# Patient Record
Sex: FEMALE | Race: BLACK OR AFRICAN AMERICAN | Employment: OTHER | URBAN - METROPOLITAN AREA
[De-identification: names, ages, dates, MRNs, and addresses within clinical notes are randomized per-mention and may not be internally consistent; named-entity substitution may affect disease eponyms.]

---

## 2023-04-10 NOTE — LETTER
No information on file.        Consent for Coronary CT Angiography    Date of Procedure:     * No procedures found * on [facility]    Your doctor has recommended that you have a Coronary CT Angiography procedure. Coronary CT Angiography is a diagnostic procedure using computed tomography to scan the chest and coronary arteries. It is a non-invasive technique that allows clear visualization of narrowed and blocked arteries. Blockage in these arteries may lead to heart attack or stroke.    You will lie on a table that slides slowly into a large circular opening called the CT gantry. The procedure will be performed by the CT Staff, including a nurse, a technologist, and a patient assistant. The staff will be with you throughout the entire procedure to explain each step, make you as comfortable as possible, and answer all of your questions. The staff will take a series of images of your heart and chest to help define the area to be imaged. You will be asked to hold your breath for a short time as each series of images is performed and acquired.     You may receive medication called a beta blocker that will slow your hear rate down. This is needed so we can obtain better images of your heart. You may also receive a nitroglycerine spray under your tongue. This medication is given to dilate the arteries for better picture quality. The nitroglycerine may cause a drop in blood pressure. During the procedure you will receive an injection of a contrast material, which assists the physician in highlighting the anatomy of your heart. You may experience a warm sensation through your body and a metallic taste in your mouth. In rare circumstances, a rash and/or hives may occur. It's very important to inform your care giver of any changes you may feel or experience.     The medication and the contrast material used as part of your procedure are all deemed very safe, however there is always an element of risk to a patient when  taking medication. Allergic reactions to medication can range from very minor to very serious, leading to a life threatening situation or even death. Please be sure to communicate any allergy you may have to your caregiver immediately.    The information that is obtained during your testing will be treated as privileged and confidential. The information obtained will be given to your doctor and may be used for insurance purposes or to track and trend data as part of  with your privacy retained.     I, Teresa Saavedra, have read and understand the above information. I voluntarily agree and consent to have the Coronary CT Angiography (CTA) Exam. Furthermore, no guarantees or assurances have been given by anyone as to the results that may be obtained from this procedure. Any questions that may have occurred to me have been answered to my satisfaction.    Signature of Patient: __________________________Date: ___________Time: _______      Patient Name: Teresa Saavedra    : 1975      Printed: 2024      Medical Record #: [id number]   [History reviewed] : History reviewed. [Medications and Allergies reviewed] : Medications and allergies reviewed.

## 2024-01-18 ENCOUNTER — HOSPITAL ENCOUNTER (OUTPATIENT)
Facility: HOSPITAL | Age: 49
Setting detail: OBSERVATION
Discharge: HOME | End: 2024-01-19
Attending: EMERGENCY MEDICINE | Admitting: INTERNAL MEDICINE
Payer: MEDICARE

## 2024-01-18 ENCOUNTER — APPOINTMENT (OUTPATIENT)
Dept: GENERAL RADIOLOGY | Facility: HOSPITAL | Age: 49
End: 2024-01-18
Payer: MEDICARE

## 2024-01-18 DIAGNOSIS — R07.9 ACUTE CHEST PAIN: Primary | ICD-10-CM

## 2024-01-18 DIAGNOSIS — Z86.79 HISTORY OF CARDIOMYOPATHY: ICD-10-CM

## 2024-01-18 LAB
ALBUMIN SERPL-MCNC: 4.4 G/DL (ref 3.2–4.8)
ALBUMIN/GLOB SERPL: 1.1 {RATIO} (ref 1–2)
ALP LIVER SERPL-CCNC: 86 U/L
ALT SERPL-CCNC: 11 U/L
ANION GAP SERPL CALC-SCNC: 7 MMOL/L (ref 0–18)
AST SERPL-CCNC: 24 U/L (ref ?–34)
BILIRUB SERPL-MCNC: 0.2 MG/DL (ref 0.3–1.2)
BUN BLD-MCNC: 13 MG/DL (ref 9–23)
BUN/CREAT SERPL: 15.9 (ref 10–20)
CALCIUM BLD-MCNC: 9.4 MG/DL (ref 8.7–10.4)
CHLORIDE SERPL-SCNC: 110 MMOL/L (ref 98–112)
CK SERPL-CCNC: 148 U/L
CO2 SERPL-SCNC: 23 MMOL/L (ref 21–32)
CREAT BLD-MCNC: 0.82 MG/DL
D DIMER PPP FEU-MCNC: 0.43 UG/ML FEU (ref ?–0.5)
EGFRCR SERPLBLD CKD-EPI 2021: 89 ML/MIN/1.73M2 (ref 60–?)
GLOBULIN PLAS-MCNC: 3.9 G/DL (ref 2.8–4.4)
GLUCOSE BLD-MCNC: 90 MG/DL (ref 70–99)
OSMOLALITY SERPL CALC.SUM OF ELEC: 290 MOSM/KG (ref 275–295)
POTASSIUM SERPL-SCNC: 4.3 MMOL/L (ref 3.5–5.1)
PROT SERPL-MCNC: 8.3 G/DL (ref 5.7–8.2)
SODIUM SERPL-SCNC: 140 MMOL/L (ref 136–145)
TROPONIN I SERPL HS-MCNC: <3 NG/L
TROPONIN I SERPL HS-MCNC: <3 NG/L

## 2024-01-18 PROCEDURE — 71045 X-RAY EXAM CHEST 1 VIEW: CPT | Performed by: EMERGENCY MEDICINE

## 2024-01-18 PROCEDURE — 84484 ASSAY OF TROPONIN QUANT: CPT | Performed by: EMERGENCY MEDICINE

## 2024-01-18 PROCEDURE — 96376 TX/PRO/DX INJ SAME DRUG ADON: CPT

## 2024-01-18 PROCEDURE — 93010 ELECTROCARDIOGRAM REPORT: CPT

## 2024-01-18 PROCEDURE — 93005 ELECTROCARDIOGRAM TRACING: CPT

## 2024-01-18 PROCEDURE — 85025 COMPLETE CBC W/AUTO DIFF WBC: CPT | Performed by: EMERGENCY MEDICINE

## 2024-01-18 PROCEDURE — 99285 EMERGENCY DEPT VISIT HI MDM: CPT

## 2024-01-18 PROCEDURE — 96375 TX/PRO/DX INJ NEW DRUG ADDON: CPT

## 2024-01-18 PROCEDURE — 82550 ASSAY OF CK (CPK): CPT | Performed by: EMERGENCY MEDICINE

## 2024-01-18 PROCEDURE — 96374 THER/PROPH/DIAG INJ IV PUSH: CPT

## 2024-01-18 PROCEDURE — 85379 FIBRIN DEGRADATION QUANT: CPT | Performed by: EMERGENCY MEDICINE

## 2024-01-18 PROCEDURE — 80053 COMPREHEN METABOLIC PANEL: CPT | Performed by: EMERGENCY MEDICINE

## 2024-01-18 PROCEDURE — 85060 BLOOD SMEAR INTERPRETATION: CPT | Performed by: EMERGENCY MEDICINE

## 2024-01-18 RX ORDER — DROPERIDOL 2.5 MG/ML
1.25 INJECTION, SOLUTION INTRAMUSCULAR; INTRAVENOUS ONCE
Status: COMPLETED | OUTPATIENT
Start: 2024-01-18 | End: 2024-01-18

## 2024-01-18 RX ORDER — KETOROLAC TROMETHAMINE 15 MG/ML
15 INJECTION, SOLUTION INTRAMUSCULAR; INTRAVENOUS ONCE
Status: COMPLETED | OUTPATIENT
Start: 2024-01-18 | End: 2024-01-18

## 2024-01-18 RX ORDER — DIAZEPAM 5 MG/ML
5 INJECTION, SOLUTION INTRAMUSCULAR; INTRAVENOUS ONCE
Status: COMPLETED | OUTPATIENT
Start: 2024-01-18 | End: 2024-01-18

## 2024-01-18 RX ORDER — MULTIVIT-MIN/IRON/FOLIC ACID/K 18-600-40
50000 CAPSULE ORAL WEEKLY
COMMUNITY

## 2024-01-18 RX ORDER — OXYCODONE HYDROCHLORIDE 20 MG/1
20 TABLET ORAL 2 TIMES DAILY PRN
COMMUNITY

## 2024-01-18 RX ORDER — ASPIRIN 81 MG/1
81 TABLET ORAL DAILY
COMMUNITY

## 2024-01-18 RX ORDER — MORPHINE SULFATE 4 MG/ML
4 INJECTION, SOLUTION INTRAMUSCULAR; INTRAVENOUS ONCE
Status: COMPLETED | OUTPATIENT
Start: 2024-01-18 | End: 2024-01-18

## 2024-01-19 ENCOUNTER — APPOINTMENT (OUTPATIENT)
Dept: CT IMAGING | Facility: HOSPITAL | Age: 49
End: 2024-01-19
Attending: INTERNAL MEDICINE
Payer: MEDICARE

## 2024-01-19 ENCOUNTER — APPOINTMENT (OUTPATIENT)
Dept: CV DIAGNOSTICS | Facility: HOSPITAL | Age: 49
End: 2024-01-19
Attending: EMERGENCY MEDICINE
Payer: MEDICARE

## 2024-01-19 VITALS
BODY MASS INDEX: 38.29 KG/M2 | RESPIRATION RATE: 16 BRPM | OXYGEN SATURATION: 100 % | HEIGHT: 70.5 IN | TEMPERATURE: 97 F | DIASTOLIC BLOOD PRESSURE: 77 MMHG | SYSTOLIC BLOOD PRESSURE: 140 MMHG | HEART RATE: 80 BPM | WEIGHT: 270.5 LBS

## 2024-01-19 PROBLEM — Z86.79 HISTORY OF CARDIOMYOPATHY: Status: ACTIVE | Noted: 2024-01-19

## 2024-01-19 LAB
ANION GAP SERPL CALC-SCNC: 2 MMOL/L (ref 0–18)
ATRIAL RATE: 66 BPM
ATRIAL RATE: 69 BPM
BASOPHILS # BLD AUTO: 0.03 X10(3) UL (ref 0–0.2)
BASOPHILS # BLD AUTO: 0.05 X10(3) UL (ref 0–0.2)
BASOPHILS NFR BLD AUTO: 0.5 %
BASOPHILS NFR BLD AUTO: 0.5 %
BUN BLD-MCNC: 14 MG/DL (ref 9–23)
BUN/CREAT SERPL: 20 (ref 10–20)
CALCIUM BLD-MCNC: 9.1 MG/DL (ref 8.7–10.4)
CHLORIDE SERPL-SCNC: 111 MMOL/L (ref 98–112)
CO2 SERPL-SCNC: 28 MMOL/L (ref 21–32)
CREAT BLD-MCNC: 0.7 MG/DL
DEPRECATED RDW RBC AUTO: 48.6 FL (ref 35.1–46.3)
DEPRECATED RDW RBC AUTO: 48.9 FL (ref 35.1–46.3)
EGFRCR SERPLBLD CKD-EPI 2021: 108 ML/MIN/1.73M2 (ref 60–?)
EOSINOPHIL # BLD AUTO: 1.31 X10(3) UL (ref 0–0.7)
EOSINOPHIL # BLD AUTO: 1.62 X10(3) UL (ref 0–0.7)
EOSINOPHIL NFR BLD AUTO: 17.5 %
EOSINOPHIL NFR BLD AUTO: 21 %
ERYTHROCYTE [DISTWIDTH] IN BLOOD BY AUTOMATED COUNT: 14.1 % (ref 11–15)
ERYTHROCYTE [DISTWIDTH] IN BLOOD BY AUTOMATED COUNT: 14.3 % (ref 11–15)
GLUCOSE BLD-MCNC: 88 MG/DL (ref 70–99)
HCT VFR BLD AUTO: 31.8 %
HCT VFR BLD AUTO: 35.6 %
HGB BLD-MCNC: 10.2 G/DL
HGB BLD-MCNC: 11.6 G/DL
IMM GRANULOCYTES # BLD AUTO: 0.01 X10(3) UL (ref 0–1)
IMM GRANULOCYTES # BLD AUTO: 0.02 X10(3) UL (ref 0–1)
IMM GRANULOCYTES NFR BLD: 0.2 %
IMM GRANULOCYTES NFR BLD: 0.2 %
LYMPHOCYTES # BLD AUTO: 1.67 X10(3) UL (ref 1–4)
LYMPHOCYTES # BLD AUTO: 2.15 X10(3) UL (ref 1–4)
LYMPHOCYTES NFR BLD AUTO: 23.2 %
LYMPHOCYTES NFR BLD AUTO: 26.7 %
MCH RBC QN AUTO: 30 PG (ref 26–34)
MCH RBC QN AUTO: 30.2 PG (ref 26–34)
MCHC RBC AUTO-ENTMCNC: 32.1 G/DL (ref 31–37)
MCHC RBC AUTO-ENTMCNC: 32.6 G/DL (ref 31–37)
MCV RBC AUTO: 92 FL
MCV RBC AUTO: 94.1 FL
MONOCYTES # BLD AUTO: 0.3 X10(3) UL (ref 0.1–1)
MONOCYTES # BLD AUTO: 0.39 X10(3) UL (ref 0.1–1)
MONOCYTES NFR BLD AUTO: 4.2 %
MONOCYTES NFR BLD AUTO: 4.8 %
NEUTROPHILS # BLD AUTO: 2.93 X10 (3) UL (ref 1.5–7.7)
NEUTROPHILS # BLD AUTO: 2.93 X10(3) UL (ref 1.5–7.7)
NEUTROPHILS # BLD AUTO: 5.03 X10 (3) UL (ref 1.5–7.7)
NEUTROPHILS # BLD AUTO: 5.03 X10(3) UL (ref 1.5–7.7)
NEUTROPHILS NFR BLD AUTO: 46.8 %
NEUTROPHILS NFR BLD AUTO: 54.4 %
OSMOLALITY SERPL CALC.SUM OF ELEC: 292 MOSM/KG (ref 275–295)
P AXIS: 51 DEGREES
P AXIS: 65 DEGREES
P-R INTERVAL: 218 MS
P-R INTERVAL: 246 MS
PLATELET # BLD AUTO: 221 10(3)UL (ref 150–450)
PLATELET # BLD AUTO: 246 10(3)UL (ref 150–450)
POTASSIUM SERPL-SCNC: 3.6 MMOL/L (ref 3.5–5.1)
Q-T INTERVAL: 388 MS
Q-T INTERVAL: 390 MS
QRS DURATION: 74 MS
QRS DURATION: 78 MS
QTC CALCULATION (BEZET): 408 MS
QTC CALCULATION (BEZET): 415 MS
R AXIS: 17 DEGREES
R AXIS: 20 DEGREES
RBC # BLD AUTO: 3.38 X10(6)UL
RBC # BLD AUTO: 3.87 X10(6)UL
SODIUM SERPL-SCNC: 141 MMOL/L (ref 136–145)
T AXIS: 16 DEGREES
T AXIS: 21 DEGREES
TROPONIN I SERPL HS-MCNC: 3 NG/L
VENTRICULAR RATE: 66 BPM
VENTRICULAR RATE: 69 BPM
WBC # BLD AUTO: 6.3 X10(3) UL (ref 4–11)
WBC # BLD AUTO: 9.3 X10(3) UL (ref 4–11)

## 2024-01-19 PROCEDURE — 85025 COMPLETE CBC W/AUTO DIFF WBC: CPT | Performed by: INTERNAL MEDICINE

## 2024-01-19 PROCEDURE — 80307 DRUG TEST PRSMV CHEM ANLYZR: CPT | Performed by: STUDENT IN AN ORGANIZED HEALTH CARE EDUCATION/TRAINING PROGRAM

## 2024-01-19 PROCEDURE — 93306 TTE W/DOPPLER COMPLETE: CPT | Performed by: EMERGENCY MEDICINE

## 2024-01-19 PROCEDURE — 75574 CT ANGIO HRT W/3D IMAGE: CPT | Performed by: INTERNAL MEDICINE

## 2024-01-19 PROCEDURE — 84484 ASSAY OF TROPONIN QUANT: CPT | Performed by: INTERNAL MEDICINE

## 2024-01-19 PROCEDURE — 75580 N-INVAS EST C FFR SW ALY CTA: CPT | Performed by: INTERNAL MEDICINE

## 2024-01-19 PROCEDURE — 80048 BASIC METABOLIC PNL TOTAL CA: CPT | Performed by: INTERNAL MEDICINE

## 2024-01-19 RX ORDER — NITROGLYCERIN 0.4 MG/1
0.4 TABLET SUBLINGUAL ONCE
Status: COMPLETED | OUTPATIENT
Start: 2024-01-19 | End: 2024-01-19

## 2024-01-19 RX ORDER — METOPROLOL TARTRATE 50 MG/1
100 TABLET, FILM COATED ORAL ONCE AS NEEDED
Status: DISCONTINUED | OUTPATIENT
Start: 2024-01-20 | End: 2024-01-19

## 2024-01-19 RX ORDER — POTASSIUM CHLORIDE 20 MEQ/1
40 TABLET, EXTENDED RELEASE ORAL EVERY 4 HOURS
Status: COMPLETED | OUTPATIENT
Start: 2024-01-19 | End: 2024-01-19

## 2024-01-19 RX ORDER — HEPARIN SODIUM 5000 [USP'U]/ML
5000 INJECTION, SOLUTION INTRAVENOUS; SUBCUTANEOUS EVERY 8 HOURS SCHEDULED
Status: DISCONTINUED | OUTPATIENT
Start: 2024-01-19 | End: 2024-01-19

## 2024-01-19 RX ORDER — METOPROLOL TARTRATE 1 MG/ML
5 INJECTION, SOLUTION INTRAVENOUS SEE ADMIN INSTRUCTIONS
Status: DISCONTINUED | OUTPATIENT
Start: 2024-01-19 | End: 2024-01-19

## 2024-01-19 RX ORDER — SENNOSIDES 8.6 MG
17.2 TABLET ORAL NIGHTLY PRN
Status: DISCONTINUED | OUTPATIENT
Start: 2024-01-19 | End: 2024-01-19

## 2024-01-19 RX ORDER — METOPROLOL TARTRATE 50 MG/1
50 TABLET, FILM COATED ORAL ONCE
Status: DISCONTINUED | OUTPATIENT
Start: 2024-01-20 | End: 2024-01-19

## 2024-01-19 RX ORDER — METOPROLOL TARTRATE 50 MG/1
50 TABLET, FILM COATED ORAL ONCE AS NEEDED
Status: COMPLETED | OUTPATIENT
Start: 2024-01-19 | End: 2024-01-19

## 2024-01-19 RX ORDER — BISACODYL 10 MG
10 SUPPOSITORY, RECTAL RECTAL
Status: DISCONTINUED | OUTPATIENT
Start: 2024-01-19 | End: 2024-01-19

## 2024-01-19 RX ORDER — METOPROLOL TARTRATE 50 MG/1
100 TABLET, FILM COATED ORAL ONCE AS NEEDED
Status: COMPLETED | OUTPATIENT
Start: 2024-01-19 | End: 2024-01-19

## 2024-01-19 RX ORDER — ALBUTEROL SULFATE 90 UG/1
2 AEROSOL, METERED RESPIRATORY (INHALATION) EVERY 4 HOURS PRN
COMMUNITY

## 2024-01-19 RX ORDER — OXYCODONE HYDROCHLORIDE 5 MG/1
20 TABLET ORAL 2 TIMES DAILY PRN
Status: DISCONTINUED | OUTPATIENT
Start: 2024-01-19 | End: 2024-01-19

## 2024-01-19 RX ORDER — POLYETHYLENE GLYCOL 3350 17 G/17G
17 POWDER, FOR SOLUTION ORAL DAILY PRN
Status: DISCONTINUED | OUTPATIENT
Start: 2024-01-19 | End: 2024-01-19

## 2024-01-19 RX ORDER — PROCHLORPERAZINE EDISYLATE 5 MG/ML
5 INJECTION INTRAMUSCULAR; INTRAVENOUS EVERY 8 HOURS PRN
Status: DISCONTINUED | OUTPATIENT
Start: 2024-01-19 | End: 2024-01-19

## 2024-01-19 RX ORDER — LIDOCAINE 50 MG/G
1 PATCH TOPICAL EVERY 24 HOURS
Qty: 30 PATCH | Refills: 0 | Status: SHIPPED | OUTPATIENT
Start: 2024-01-19

## 2024-01-19 RX ORDER — METOPROLOL TARTRATE 1 MG/ML
INJECTION, SOLUTION INTRAVENOUS
Status: DISCONTINUED
Start: 2024-01-19 | End: 2024-01-19 | Stop reason: WASHOUT

## 2024-01-19 RX ORDER — ACETAMINOPHEN 325 MG/1
650 TABLET ORAL EVERY 6 HOURS PRN
Status: DISCONTINUED | OUTPATIENT
Start: 2024-01-19 | End: 2024-01-19

## 2024-01-19 RX ORDER — METOPROLOL TARTRATE 50 MG/1
50 TABLET, FILM COATED ORAL ONCE
Status: DISCONTINUED | OUTPATIENT
Start: 2024-01-19 | End: 2024-01-19

## 2024-01-19 RX ORDER — ONDANSETRON 2 MG/ML
4 INJECTION INTRAMUSCULAR; INTRAVENOUS EVERY 6 HOURS PRN
Status: DISCONTINUED | OUTPATIENT
Start: 2024-01-19 | End: 2024-01-19

## 2024-01-19 RX ORDER — DILTIAZEM HYDROCHLORIDE 5 MG/ML
5 INJECTION INTRAVENOUS SEE ADMIN INSTRUCTIONS
Status: DISCONTINUED | OUTPATIENT
Start: 2024-01-19 | End: 2024-01-19

## 2024-01-19 RX ORDER — METOPROLOL TARTRATE 50 MG/1
100 TABLET, FILM COATED ORAL ONCE
Status: DISCONTINUED | OUTPATIENT
Start: 2024-01-20 | End: 2024-01-19

## 2024-01-19 RX ORDER — ENEMA 19; 7 G/133ML; G/133ML
1 ENEMA RECTAL ONCE AS NEEDED
Status: DISCONTINUED | OUTPATIENT
Start: 2024-01-19 | End: 2024-01-19

## 2024-01-19 RX ORDER — NITROGLYCERIN 0.4 MG/1
0.4 TABLET SUBLINGUAL EVERY 5 MIN PRN
Status: DISCONTINUED | OUTPATIENT
Start: 2024-01-19 | End: 2024-01-19

## 2024-01-19 RX ORDER — METOPROLOL TARTRATE 50 MG/1
50 TABLET, FILM COATED ORAL ONCE AS NEEDED
Status: DISCONTINUED | OUTPATIENT
Start: 2024-01-20 | End: 2024-01-19

## 2024-01-19 RX ORDER — ASPIRIN 81 MG/1
81 TABLET ORAL DAILY
Status: DISCONTINUED | OUTPATIENT
Start: 2024-01-19 | End: 2024-01-19

## 2024-01-19 RX ORDER — METOPROLOL TARTRATE 50 MG/1
100 TABLET, FILM COATED ORAL ONCE
Status: DISCONTINUED | OUTPATIENT
Start: 2024-01-19 | End: 2024-01-19

## 2024-01-19 RX ORDER — SODIUM CHLORIDE, SODIUM LACTATE, POTASSIUM CHLORIDE, CALCIUM CHLORIDE 600; 310; 30; 20 MG/100ML; MG/100ML; MG/100ML; MG/100ML
INJECTION, SOLUTION INTRAVENOUS CONTINUOUS
Status: DISCONTINUED | OUTPATIENT
Start: 2024-01-19 | End: 2024-01-19

## 2024-01-19 NOTE — DISCHARGE SUMMARY
General Medicine Discharge Summary     Patient ID:  Teresa Saavedra  48 year old  2/1/1975    Admit date: 1/18/2024    Discharge date and time: 01/19/24    Attending Physician: Aliyah Mayer DO     Consults: IP CONSULT TO CARDIOLOGY    Primary Care Physician: No primary care provider on file.     Reason for admission: non cardiac CP    Risk of Readmission Lace+ Score: 21  59-90 High Risk  29-58 Medium Risk  0-28   Low Risk    Discharge Diagnoses: Acute chest pain [R07.9]  History of cardiomyopathy [Z86.79]  See Additional Discharge Diagnoses in Hospital Course    Discharged Condition: good    Follow-up with labs/images appointments:   F/u with PCP    Exam  Gen: No acute distress  Pulm: Lungs clear, normal respiratory effort  CV: Heart with regular rate and rhythm  Abd: Abdomen soft,   EXT: no edema     HPI:  Patient is a 48 year old female with PMH sig for asthma, DVT not on AC who presented with chest pain. Patient said that pain developed acutely yesterday when she was sitting. Comes and goes. No radiation, no shortness of breath. Located left side of her chest, sharp in nature. 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 Upon arrival, vitals stable. High sensitivity troponin of 3 and 3. EKG showed no acute ST changes. She was subsequently admitted for further medical management.      Hospital Course:   Patient is a 48 year old female with PMH sig for asthma, DVT not on AC who presented with chest pain. Underwent cardiac evaluation. Troponin and EKG negative. Seen by cardiology and had CTA and echo completed. Echo normal with no WMA, dc with CTA pending read     Atypical chest pain  - likely noncardiac, reproducible on examination  - high sensitivity troponins of 3 and 3  - EKG reviewed, no acute ST changes  - evaluated by cardiology, appreciate recommendations   - echo completed, EF 55%, no WMA  - CTA coronary pending, if normal then  cleared for dc  - prn lidocaine patch and tylenol for CP  - continue ASA     Asthma  - chronic, stable  - prn albuterol     Chronic pain  - resume home pain medications     Daily cigarette smoker  - > 20 years  - counseled on smoking cessation for 5 minutes    Operative Procedures:      Imaging: CT CARDIAC OVER READ    Result Date: 1/19/2024  CONCLUSION: As above    Dictated by (CST): Og Padron MD on 1/19/2024 at 12:06 PM     Finalized by (CST): Og Padron MD on 1/19/2024 at 12:07 PM          XR CHEST AP PORTABLE  (CPT=71045)    Result Date: 1/18/2024  CONCLUSION: Normal examination.     Dictated by (CST): Bello Cordova MD on 1/18/2024 at 8:12 PM     Finalized by (CST): Bello Cordova MD on 1/18/2024 at 8:13 PM             Disposition: home    Activity: activity as tolerated  Diet: regular diet  Wound Care: as directed  Code Status: No Order  O2:     Home Medication Changes:     Med list     Medication List        START taking these medications      lidocaine 5 % Ptch  Commonly known as: Lidoderm  Place 1 patch onto the skin daily.            CONTINUE taking these medications      albuterol 108 (90 Base) MCG/ACT Aers  Commonly known as: Ventolin HFA     aspirin 81 MG Tbec     oxyCODONE HCl 20 MG Tabs     Vitamin D 50 MCG (2000 UT) Caps               Where to Get Your Medications        You can get these medications from any pharmacy    Bring a paper prescription for each of these medications  lidocaine 5 % Ptch         FU      DC instructions:      I reconciled current and discharge medications on day of discharge, discussed changes with patient and noted changes above.       Total Time Coordinating Care: Greater than 30 minutes    Patient had opportunity to ask questions and state understand and agree with therapeutic plan as outlined    Thank You,    Aliyah Mayer DO   Hospitalist with [facility]

## 2024-01-19 NOTE — H&P
[facility] Hospitalist H&P       CC:   Chief Complaint   Patient presents with    Chest Pain Angina        PCP: No primary care provider on file.    History of Present Illness: Patient is a 48 year old female with PMH sig for asthma, DVT not on AC who presented with chest pain. Patient said that pain developed acutely yesterday when she was sitting. Comes and goes. No radiation, no shortness of breath. Located left side of her chest, sharp in nature. 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 Upon arrival, vitals stable. High sensitivity troponin of 3 and 3. EKG showed no acute ST changes. She was subsequently admitted for further medical management.       PMH  Past Medical History:   Diagnosis Date    Asthma     DVT of leg (deep venous thrombosis) (HCC)     left thigh 2023    Heart attack (HCC)         PSH  Past Surgical History:   Procedure Laterality Date    UPPER ARM/ELBOW SURGERY UNLISTED Right         ALL:  Allergies   Allergen Reactions    Percocet [Oxycodone-Acetaminophen] HIVES        Home Medications:  Outpatient Medications Marked as Taking for the 1/18/24 encounter (Hospital Encounter)   Medication Sig Dispense Refill    albuterol 108 (90 Base) MCG/ACT Inhalation Aero Soln Inhale 2 puffs into the lungs every 4 (four) hours as needed for Wheezing.      oxyCODONE HCl 20 MG Oral Tab Take 1 tablet (20 mg total) by mouth 2 (two) times daily as needed (pain).      aspirin 81 MG Oral Tab EC Take 1 tablet (81 mg total) by mouth daily.      Cholecalciferol (VITAMIN D) 50 MCG (2000 UT) Oral Cap Take 25 capsules (50,000 Units total) by mouth once a week.           Soc Hx  Social History     Tobacco Use    Smoking status: Every Day     Types: Cigarettes    Smokeless tobacco: Never   Substance Use Topics    Alcohol use: Yes     Comment: occasionally        Fam Hx  No family history on file.    Review of Systems  Comprehensive ROS  reviewed and negative except for what's stated above.     OBJECTIVE:  BP 96/85 (BP Location: Left arm)   Pulse 58   Temp 97.4 °F (36.3 °C) (Oral)   Resp 20   Ht 5' 10.5\" (1.791 m)   Wt 270 lb 8 oz (122.7 kg)   LMP 12/05/2023 (Approximate)   SpO2 100%   BMI 38.26 kg/m²   General:  Alert, no distress   Head:  Normocephalic, without obvious abnormality, atraumatic.                       Chest wall:  Tenderness to palpation over the L chest    Heart:  Regular rate and rhythm, S1, S2 normal,    Abdomen:   Soft, non-tender. Bowel sounds normal.   Extremities: Extremities normal, atraumatic,              Diagnostic Data:    CBC/Chem  Recent Labs   Lab 01/18/24 1934 01/19/24  0636   WBC 9.3 6.3   HGB 11.6* 10.2*   MCV 92.0 94.1   .0 221.0       Recent Labs   Lab 01/18/24 1934 01/19/24  0636    141   K 4.3 3.6    111   CO2 23.0 28.0   BUN 13 14   CREATSERUM 0.82 0.70   GLU 90 88   CA 9.4 9.1       Recent Labs   Lab 01/18/24 1934   ALT 11   AST 24   ALB 4.4       No results for input(s): \"TROP\" in the last 168 hours.    Radiology: CT CARDIAC OVER READ    Result Date: 1/19/2024  CONCLUSION: As above    Dictated by (CST): Og Padron MD on 1/19/2024 at 12:06 PM     Finalized by (CST): Og Padron MD on 1/19/2024 at 12:07 PM          XR CHEST AP PORTABLE  (CPT=71045)    Result Date: 1/18/2024  CONCLUSION: Normal examination.     Dictated by (CST): Bello Cordova MD on 1/18/2024 at 8:12 PM     Finalized by (CST): Bello Cordova MD on 1/18/2024 at 8:13 PM             ASSESSMENT / PLAN:   Patient is a 48 year old female with PMH sig for asthma, DVT not on AC who presented with chest pain.     Atypical chest pain  - likely noncardiac, reproducible on examination  - high sensitivity troponins of 3 and 3  - EKG reviewed, no acute ST changes  - evaluated by cardiology, appreciate recommendations   - echo completed, EF 55%, no WMA  - CTA coronary pending, if normal then cleared for dc  - prn lidocaine  patch and tylenol for CP  - continue ASA    Asthma  - chronic, stable  - prn albuterol    Chronic pain  - resume home pain medications    Daily cigarette smoker  - > 20 years  - counseled on smoking cessation for 5 minutes    FN:  - IVF: none  - Diet: cardiac    DVT Prophy: SQH  Lines: PIV    Dispo: pending clinical course    Outpatient records or previous hospital records reviewed.     Further recommendations pending patient's clinical course.  [facility] hospitalist to continue to follow patient while in house    Patient and/or patient's family given opportunity to ask questions and note understanding and agreeing with therapeutic plan as outlined    Thank You,  Aliyah Mayer DO    Hospitalist with [facility] Service number: [phone]

## 2024-01-19 NOTE — PLAN OF CARE
Lactated Ringers.  NPO overnight until cardiology to see.  Echo in AM.  Troponin negative. CXR negative.  Denies sob at this time.      Problem: CARDIOVASCULAR - ADULT  Goal: Maintains optimal cardiac output and hemodynamic stability  Description: INTERVENTIONS:  - Monitor vital signs, rhythm, and trends  - Monitor for bleeding, hypotension and signs of decreased cardiac output  - Evaluate effectiveness of vasoactive medications to optimize hemodynamic stability  - Monitor arterial and/or venous puncture sites for bleeding and/or hematoma  - Assess quality of pulses, skin color and temperature  - Assess for signs of decreased coronary artery perfusion - ex. Angina  - Evaluate fluid balance, assess for edema, trend weights  Outcome: Not Progressing  Goal: Absence of cardiac arrhythmias or at baseline  Description: INTERVENTIONS:  - Continuous cardiac monitoring, monitor vital signs, obtain 12 lead EKG if indicated  - Evaluate effectiveness of antiarrhythmic and heart rate control medications as ordered  - Initiate emergency measures for life threatening arrhythmias  - Monitor electrolytes and administer replacement therapy as ordered  Outcome: Not Progressing

## 2024-01-19 NOTE — PLAN OF CARE
Pt alert and oriented x4. Pt on room air. Pt completed CTA. Pt refusing potassium being drawn. Pt stating that she would like to discharge before her results of CTA FFR. Primary RN educated patient on importance of results. DO okay with dc. Pt ambulating per self.  Plan to dc home.   Problem: CARDIOVASCULAR - ADULT  Goal: Maintains optimal cardiac output and hemodynamic stability  Description: INTERVENTIONS:  - Monitor vital signs, rhythm, and trends  - Monitor for bleeding, hypotension and signs of decreased cardiac output  - Evaluate effectiveness of vasoactive medications to optimize hemodynamic stability  - Monitor arterial and/or venous puncture sites for bleeding and/or hematoma  - Assess quality of pulses, skin color and temperature  - Assess for signs of decreased coronary artery perfusion - ex. Angina  - Evaluate fluid balance, assess for edema, trend weights  Outcome: Completed  Goal: Absence of cardiac arrhythmias or at baseline  Description: INTERVENTIONS:  - Continuous cardiac monitoring, monitor vital signs, obtain 12 lead EKG if indicated  - Evaluate effectiveness of antiarrhythmic and heart rate control medications as ordered  - Initiate emergency measures for life threatening arrhythmias  - Monitor electrolytes and administer replacement therapy as ordered  Outcome: Completed

## 2024-01-19 NOTE — DISCHARGE PLANNING
Notified by patient's primary RN Sondra that patient is requesting [facility] transportation to be set up for discharge. Spoke to [facility] with [place] and set up medicar for next available. ETA is 7:15-7:30pm. PCS form completed.     UPDATE 1655: [facility] canceled per patient request.

## 2024-01-19 NOTE — CONSULTS
[facility] CARDIOLOGY CONSULT      Teresa Saavedra is a 48 year old female who I assessed as follows:  Chief Complaint   Patient presents with    Chest Pain Angina          REASON FOR CONSULTATION:    chest pain            ASSESSMENT/PLAN:     IMPRESSIONS:  Chest pain, atypical  History of \"stress heart attack\"  History of DVT  Smoker, advised to quit.        PLAN:  CTA coronaries  ECGs reviewed  echo            --------------------------------------------------------------------------------------------------------------------------------    HPI:         Presents with chest pain which began yesterday, by left breast, not consistently worse with exertion, not pleuritic, no associated dyspnea, appeared to be intermittent (she gives somewhat conflicting answers), lasting at least 3 hours, resolved on its own, better if holds up breast.    She states prior \"stress heart attack\". In Care Everywhere, there is admission 2016 for chest pain with normal troponin and normal  stress echo. Urine + for cocaine at that time.    Smoker.             No current outpatient medications on file.      Past Medical History:   Diagnosis Date    Asthma     DVT of leg (deep venous thrombosis) ([facility])     left thigh     Heart attack (HCC)      Past Surgical History:   Procedure Laterality Date    UPPER ARM/ELBOW SURGERY UNLISTED Right      No family history on file.   Social History:  Social History     Socioeconomic History    Marital status: Single   Tobacco Use    Smoking status: Every Day     Types: Cigarettes    Smokeless tobacco: Never   Vaping Use    Vaping Use: Never used   Substance and Sexual Activity    Alcohol use: Yes     Comment: occasionally    Drug use: Yes     Types: Cannabis     Social Determinants of Health     Food Insecurity: No Food Insecurity (2024)    Food Insecurity     Food Insecurity: Never true   Transportation Needs: No Transportation Needs (2024)    Transportation Needs     Lack of  Transportation: No   Housing Stability: Low Risk  (2024)    Housing Stability     Housing Instability: No          Medications:  Current Facility-Administered Medications   Medication Dose Route Frequency    aspirin DR tab 81 mg  81 mg Oral Daily    oxyCODONE immediate release tab 20 mg  20 mg Oral BID PRN    lactated ringers infusion   Intravenous Continuous    heparin (Porcine) 5000 UNIT/ML injection 5,000 Units  5,000 Units Subcutaneous Q8H SEKOU    polyethylene glycol (PEG 3350) (Miralax) 17 g oral packet 17 g  17 g Oral Daily PRN    sennosides (Senokot) tab 17.2 mg  17.2 mg Oral Nightly PRN    bisacodyl (Dulcolax) 10 MG rectal suppository 10 mg  10 mg Rectal Daily PRN    fleet enema (Fleet) 7-19 GM/118ML rectal enema 133 mL  1 enema Rectal Once PRN    ondansetron (Zofran) 4 MG/2ML injection 4 mg  4 mg Intravenous Q6H PRN    prochlorperazine (Compazine) 10 MG/2ML injection 5 mg  5 mg Intravenous Q8H PRN           Allergies  Allergies   Allergen Reactions    Percocet [Oxycodone-Acetaminophen] HIVES               REVIEW OF SYSTEMS:   GENERAL HEALTH: no fevers  SKIN: denies any unusual skin lesions or rashes   EARS: no recent changes in hearing  EYE: no recent vision changes  THROAT: denies sore throat  RESPIRATORY: denies cough or shortness of breath with exertion  CARDIOVASCULAR: chest pain  GI: denies abdominal pain, nausea and vomiting  NEURO: denies headaches and focal neurologic symptoms  PSYCH: no recent depression  ENDOCRINE: no change in sleep pattern  HEME: no easy bruising  All other systems reviewed and negative.          EXAM:         TELE:           /68 (BP Location: Right arm)   Pulse 75   Temp 98 °F (36.7 °C) (Oral)   Resp 18   Ht 5' 10.5\" (1.791 m)   Wt 270 lb 8 oz (122.7 kg)   LMP 2023 (Approximate)   SpO2 98%   BMI 38.26 kg/m²   Temp (24hrs), Av °F (36.7 °C), Min:97.8 °F (36.6 °C), Max:98.1 °F (36.7 °C)    Wt Readings from Last 3 Encounters:   24 270 lb 8 oz  (122.7 kg)       No intake or output data in the 24 hours ending 01/19/24 0657      GENERAL: well developed, well nourished, in no apparent distress  SKIN: no rashes  HEENT: atraumatic, normocephalic, throat without erythema  NECK: supple, no bruits  LUNGS: clear to auscultation  CARDIO: RRR without murmur or S3   GI: soft, nontender  EXTREMITIES: no cyanosis, clubbing or edema  NEUROLOGY: alert  PSYCH: cooperative          LABORATORY DATA:               ECG: SR, 1st AVB        ECHO:          Labs:  Recent Labs   Lab 01/18/24 1934   GLU 90   BUN 13   CREATSERUM 0.82   CA 9.4      K 4.3      CO2 23.0     No results for input(s): \"TROP\" in the last 168 hours.  Recent Labs   Lab 01/18/24 1934   RBC 3.87   HGB 11.6*   HCT 35.6   MCV 92.0   MCH 30.0   MCHC 32.6   RDW 14.3   NEPRELIM 5.03   WBC 9.3   .0     Recent Labs   Lab 01/18/24 1934 01/18/24  2214   TROPHS <3 <3   *  --        Imaging:  XR CHEST AP PORTABLE  (CPT=71045)    Result Date: 1/18/2024  CONCLUSION: Normal examination.     Dictated by (CST): Bello Cordova MD on 1/18/2024 at 8:12 PM     Finalized by (CST): Bello Cordova MD on 1/18/2024 at 8:13 PM                Isac Colin MD

## 2024-01-19 NOTE — IMAGING NOTE
TO RAD HOLDING AT 1030, PT ON BED, TRANSPORTER ESCORT      HX TAKEN: CHEST PAIN     PT CONSENTED BY STAFF RN     1000 VITAL SIGNS TAKEN BY STAFF RN AFTER SL NTG FOR CHEST PAIN: HR 63  BP 94/77 (84) ,BMI 38.26    CTA ORDERED BY RAGHAV BRAN MD    METOPROLOL PO GIVEN 50 MILLIGRAMS AT 0809 BY STAFF RN, HR 66    METOPROLOL PO GIVEN 50 MILLIGRAMS  AT 0906 BY STAFF RN, HR 68    18 GAUGE IV STARTED BY STAFF RN;  GFR = 108   CREATINE = 0.7    1035: HR 53 /84 (92); PT HAS PAIN MID CHEST, SAYS LIFTING HER LEFT BREAST REDUCES PAIN    1038 CT DEPARTMENT NOT YET READY     TO CT TABLE @ 1100    CONNECT TO MONITOR  HR 60 /76 (87)      NITROGLYCERIN 0.4 MILLIGRAMS SUBLINGUAL GIVEN AT 1103     CALCIUM SCORE COMPLETED AT 1107     INJECTION STARTED AT 1109, HR 46 DURING SCAN, PROCEDURE COMPLETE; PT HAS 7/10 HEADACHE    POST SCAN HR 65 /72 (83) AT 1112; PT ASSISTED BACK TO BED     1118 ECHO TECH WITH MY ESCORT TAKING PT TO ECHO DEPT    1124 HEADACHE DECREASING, PT DECLINED TO RATE ON 0-10 SCALE; REPORT CALLED TO NATALIYA RN, PT NEEDS VS Q 15 MIN X2 ONCE BACK TO ROOM, HYDRATE, CAN HAVE TYL FOR HEADACHE, PT REQUESTING MED FOR PAIN IN CHEST.

## 2024-01-19 NOTE — ED QUICK NOTES
Patient reports CP and feeling SOB that began earlier today. Patient reports that she has a \"DVT in the left thigh\" x 1 year. Patient states she does not take a blood thinner. Patient stated, \" I don't like taking those kind of medicines. They wanted me to be on it but I decided to take a baby aspirin instead\". Patient reports she has not been evaluated for her DVT x 1 year.
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
## 2024-01-19 NOTE — PLAN OF CARE
Awaiting CTA/Echo results. Plan for home this afternoon. Pian management for costochondritis.      Problem: Patient Centered Care  Goal: Patient preferences are identified and integrated in the patient's plan of care  Description: Interventions:  - What would you like us to know as we care for you?   - Provide timely, complete, and accurate information to patient/family  - Incorporate patient and family knowledge, values, beliefs, and cultural backgrounds into the planning and delivery of care  - Encourage patient/family to participate in care and decision-making at the level they choose  - Honor patient and family perspectives and choices  Outcome: Progressing     Problem: Patient/Family Goals  Goal: Patient/Family Long Term Goal  Description: Patient's Long Term Goal:     Interventions:  -   - See additional Care Plan goals for specific interventions  Outcome: Progressing  Goal: Patient/Family Short Term Goal  Description: Patient's Short Term Goal:     Interventions:   -   - See additional Care Plan goals for specific interventions  Outcome: Progressing     Problem: CARDIOVASCULAR - ADULT  Goal: Maintains optimal cardiac output and hemodynamic stability  Description: INTERVENTIONS:  - Monitor vital signs, rhythm, and trends  - Monitor for bleeding, hypotension and signs of decreased cardiac output  - Evaluate effectiveness of vasoactive medications to optimize hemodynamic stability  - Monitor arterial and/or venous puncture sites for bleeding and/or hematoma  - Assess quality of pulses, skin color and temperature  - Assess for signs of decreased coronary artery perfusion - ex. Angina  - Evaluate fluid balance, assess for edema, trend weights  Outcome: Progressing  Goal: Absence of cardiac arrhythmias or at baseline  Description: INTERVENTIONS:  - Continuous cardiac monitoring, monitor vital signs, obtain 12 lead EKG if indicated  - Evaluate effectiveness of antiarrhythmic and heart rate control medications as  ordered  - Initiate emergency measures for life threatening arrhythmias  - Monitor electrolytes and administer replacement therapy as ordered  Outcome: Progressing

## 2024-01-19 NOTE — ED QUICK NOTES
Orders for admission, patient is aware of plan and ready to go upstairs. Any questions, please call ED LESTER Clifford at extension [phone].     Patient Covid vaccination status: Unvaccinated     COVID Test Ordered in ED: None    COVID Suspicion at Admission: N/A    Running Infusions:  None    Mental Status/LOC at time of transport: a/o x 4    Other pertinent information:   CIWA score: N/A   NIH score:  N/A

## 2024-01-19 NOTE — ED INITIAL ASSESSMENT (HPI)
Patient arrived via EMS from home with c/o chest pain that began around 4:30 PM while she was laying down. Reports she felt intermittent shortness of breath and dizziness.

## 2024-12-10 ENCOUNTER — HOSPITAL ENCOUNTER (EMERGENCY)
Facility: HOSPITAL | Age: 49
Discharge: HOME | End: 2024-12-10
Attending: EMERGENCY MEDICINE
Payer: MEDICARE

## 2024-12-10 VITALS
OXYGEN SATURATION: 99 % | SYSTOLIC BLOOD PRESSURE: 120 MMHG | TEMPERATURE: 98 F | RESPIRATION RATE: 20 BRPM | HEIGHT: 70 IN | BODY MASS INDEX: 34.79 KG/M2 | WEIGHT: 243 LBS | HEART RATE: 75 BPM | DIASTOLIC BLOOD PRESSURE: 83 MMHG

## 2024-12-10 DIAGNOSIS — S40.859A: Primary | ICD-10-CM

## 2024-12-10 DIAGNOSIS — L02.413 ABSCESS OF RIGHT ARM: ICD-10-CM

## 2024-12-10 DIAGNOSIS — L08.9: Primary | ICD-10-CM

## 2024-12-10 PROCEDURE — 10120 INC&RMVL FB SUBQ TISS SMPL: CPT

## 2024-12-10 PROCEDURE — 10060 I&D ABSCESS SIMPLE/SINGLE: CPT

## 2024-12-10 PROCEDURE — 99284 EMERGENCY DEPT VISIT MOD MDM: CPT

## 2024-12-10 PROCEDURE — 99283 EMERGENCY DEPT VISIT LOW MDM: CPT

## 2024-12-10 RX ORDER — DOXYCYCLINE 100 MG/1
100 CAPSULE ORAL 2 TIMES DAILY
Qty: 14 CAPSULE | Refills: 0 | Status: SHIPPED | OUTPATIENT
Start: 2024-12-10 | End: 2024-12-17

## 2024-12-10 RX ORDER — IBUPROFEN 600 MG/1
600 TABLET, FILM COATED ORAL ONCE
Status: COMPLETED | OUTPATIENT
Start: 2024-12-10 | End: 2024-12-10

## 2024-12-10 RX ORDER — LIDOCAINE HCL/EPINEPHRINE/PF 2%-1:200K
20 VIAL (ML) INJECTION ONCE
Status: COMPLETED | OUTPATIENT
Start: 2024-12-10 | End: 2024-12-10
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
